# Patient Record
Sex: MALE | Race: WHITE | NOT HISPANIC OR LATINO | URBAN - NONMETROPOLITAN AREA
[De-identification: names, ages, dates, MRNs, and addresses within clinical notes are randomized per-mention and may not be internally consistent; named-entity substitution may affect disease eponyms.]

---

## 2019-01-14 ENCOUNTER — IMPORTED ENCOUNTER (OUTPATIENT)
Dept: URBAN - NONMETROPOLITAN AREA CLINIC 1 | Facility: CLINIC | Age: 84
End: 2019-01-14

## 2019-01-14 PROBLEM — Z98.41: Noted: 2019-01-14

## 2019-01-14 PROBLEM — H52.223: Noted: 2019-01-14

## 2019-01-14 PROBLEM — H52.13: Noted: 2019-01-14

## 2019-01-14 PROBLEM — H25.12: Noted: 2019-01-14

## 2019-01-14 PROBLEM — H35.3121: Noted: 2019-01-14

## 2019-01-14 PROCEDURE — 66984 XCAPSL CTRC RMVL W/O ECP: CPT

## 2019-01-14 PROCEDURE — 92136 OPHTHALMIC BIOMETRY: CPT

## 2019-01-14 PROCEDURE — 99024 POSTOP FOLLOW-UP VISIT: CPT

## 2019-01-14 NOTE — PATIENT DISCUSSION
s/p PCIOL OD Stand/Trad 1/14/18 JS -Pt doing well s/p PCIOL. -Continue post-op gtts according to instruction sheet and sleep with eye shield over eye for 7 nights.-Avoid bending at the waist lifting anything over 5lbs and dirty or kristina environments. -RTC 1 week POV

## 2019-01-21 ENCOUNTER — IMPORTED ENCOUNTER (OUTPATIENT)
Dept: URBAN - NONMETROPOLITAN AREA CLINIC 1 | Facility: CLINIC | Age: 84
End: 2019-01-21

## 2019-01-21 PROBLEM — Z98.41: Noted: 2019-01-14

## 2019-01-21 PROBLEM — H35.3121: Noted: 2019-01-21

## 2019-01-21 PROBLEM — Z01.818: Noted: 2019-01-21

## 2019-01-21 PROBLEM — H25.12: Noted: 2019-01-14

## 2019-01-21 PROCEDURE — 99024 POSTOP FOLLOW-UP VISIT: CPT

## 2019-01-21 PROCEDURE — 92012 INTRM OPH EXAM EST PATIENT: CPT

## 2019-01-21 NOTE — PATIENT DISCUSSION
Cataract(s)-Visually significant cataract OS . -Cataract(s) causing symptomatic impairment of visual function not correctable with a tolerable change in glasses or contact lenses lighting or non-operative means resulting in specific activity limitations and/or participation restrictions including but not limited to reading viewing television driving or meeting vocational or recreational needs. -Expectation is clearer vision and functional improvement in symptoms as well as reduced glare disability after cataract removal.-Recommend stand/trad based on previous OPD testing and lifestyle questionnaire.-All questions were answered regarding surgery including pre and post-op medications appointments activity restrictions and anesthetic usage.-The risks benefits and alternatives and special risk factors for the patient were discussed in detail including but not limited to: bleeding infection retinal detachment vitreous loss problems with the implant and possible need for additional surgery.-Although rare the possibility of complete vision loss was discussed.-The need for glasses post-operatively was discussed.-Patient elects to proceed with cataract surgery OS . Will schedule at patient's convenience. - Updated H&P today. no outstanding concerns for surgery. Pt cleared for surgery. s/p PCIOL OD-Pt doing well at 1 week s/p PCIOL. -Continue post-op gtts according to instruction sheet.-Okay to resume usual activites and d/c eye shield.

## 2019-01-24 ENCOUNTER — IMPORTED ENCOUNTER (OUTPATIENT)
Dept: URBAN - NONMETROPOLITAN AREA CLINIC 1 | Facility: CLINIC | Age: 84
End: 2019-01-24

## 2019-01-24 PROBLEM — H35.3121: Noted: 2019-01-24

## 2019-01-24 PROBLEM — Z98.42: Noted: 2019-01-24

## 2019-01-24 PROBLEM — Z98.41: Noted: 2019-01-24

## 2019-01-24 NOTE — PATIENT DISCUSSION
S/P CE Stand IOL/Trad Sx OS 1/23/19-  discussed findings w/patient-  Pt doing well s/p PCIOL. -  Continue post-op gtts according to instruction sheet and sleep with eye shield over eye for 7 nights. -  Avoid bending at the waist lifting anything over 5lbs and dirty or kristina environments.-  RTC  1 week POV

## 2019-01-31 ENCOUNTER — IMPORTED ENCOUNTER (OUTPATIENT)
Dept: URBAN - NONMETROPOLITAN AREA CLINIC 1 | Facility: CLINIC | Age: 84
End: 2019-01-31

## 2019-01-31 PROCEDURE — 99024 POSTOP FOLLOW-UP VISIT: CPT

## 2019-01-31 NOTE — PATIENT DISCUSSION
S/P CE Stand IOL/Trad Sx OS 1/23/19-  discussed findings w/patient-  Pt doing well s/p PC IOL. -  Continue post op gtts-  Patient may return to normal activities at this time-  RTC 3 week PORM

## 2019-02-21 ENCOUNTER — IMPORTED ENCOUNTER (OUTPATIENT)
Dept: URBAN - NONMETROPOLITAN AREA CLINIC 1 | Facility: CLINIC | Age: 84
End: 2019-02-21

## 2019-02-21 PROBLEM — Z98.42: Noted: 2019-01-24

## 2019-02-21 PROBLEM — Z98.41: Noted: 2019-01-24

## 2019-02-21 PROBLEM — H35.3121: Noted: 2019-02-21

## 2019-02-21 PROCEDURE — 99024 POSTOP FOLLOW-UP VISIT: CPT

## 2019-02-21 PROCEDURE — 92015 DETERMINE REFRACTIVE STATE: CPT

## 2019-02-21 NOTE — PATIENT DISCUSSION
Pseudophakia OU -  discussed findings w/patient-  doing well and stable -  new spectacle Rx issued-  monitor 3 month Pseudophakia OU w/DFE; 's Notes: MR 2/21/2019DFE

## 2019-08-16 NOTE — PATIENT DISCUSSION
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

## 2019-08-16 NOTE — PATIENT DISCUSSION
pt wishes to try CLs will need I&R.  See 8/16/19 Exam for CL graves.  Never worn CLs before.  Wants them for Skiing, beach, snorkeling, etc. Ed pt on MF CLs & pt wants to start with dist first.

## 2019-10-16 NOTE — PATIENT DISCUSSION
10/16/19: Disc MF option vs spherical in depth today. Pt wishes to start with spherical lens first Bio[employer] kit given. Patient can insert & remove CLs in office today.  pt may elect to get multifocal trials at a later date ed this is a more complicated fit than SV CTLs.

## 2021-04-19 ENCOUNTER — IMPORTED ENCOUNTER (OUTPATIENT)
Dept: URBAN - NONMETROPOLITAN AREA CLINIC 1 | Facility: CLINIC | Age: 86
End: 2021-04-19

## 2021-04-19 PROBLEM — Z96.1: Noted: 2021-04-19

## 2021-04-19 PROBLEM — H35.3121: Noted: 2021-04-19

## 2021-04-19 PROCEDURE — 99214 OFFICE O/P EST MOD 30 MIN: CPT

## 2021-04-19 NOTE — PATIENT DISCUSSION
Pseudophakia w/ PCO OU-  discussed findings w/patient-  doing well and stable -  2+ PCO noted OU not surgical yet-  MR deferred no spectacle Rx issued today-  continue to monitor yearly w/ BATARMD-  discussed findings w/ patient -  condition is stable at this time-  no positive family hx-  continue Preservision BID PO-  continue amsler grid no changes noted at this time-  patient instructed to call if any changes in vision occur-  will need to establish baseline testing at next exam-  contineu to monitor yearly w/ OCT Mac; 's Notes: MR deferred 4/19/2021DFE 4/19/2021

## 2022-04-09 ASSESSMENT — TONOMETRY
OD_IOP_MMHG: 13
OS_IOP_MMHG: 13
OD_IOP_MMHG: 18
OS_IOP_MMHG: 13
OD_IOP_MMHG: 12
OS_IOP_MMHG: 14
OS_IOP_MMHG: 12
OD_IOP_MMHG: 12
OS_IOP_MMHG: 17
OD_IOP_MMHG: 13

## 2022-04-09 ASSESSMENT — VISUAL ACUITY
OD_CC: 20/40
OS_PH: 20/40
OS_GLARE: 20/200
OS_SC: 20/30
OU_CC: 20/20-
OD_PH: 20/20-2
OU_CC: 20/20
OD_CC: 20/20-
OD_PH: 20/25+2
OD_CC: 20/30-1
OS_CC: 20/30
OS_AM: 20/20
OD_CC: 20/100
OU_CC: 20/20
OS_CC: 20/40-2
OS_CC: 20/30-2
OS_CC: 20/20-